# Patient Record
Sex: FEMALE | Race: BLACK OR AFRICAN AMERICAN | NOT HISPANIC OR LATINO | Employment: OTHER | URBAN - METROPOLITAN AREA
[De-identification: names, ages, dates, MRNs, and addresses within clinical notes are randomized per-mention and may not be internally consistent; named-entity substitution may affect disease eponyms.]

---

## 2017-04-19 ENCOUNTER — OFFICE VISIT (OUTPATIENT)
Dept: RETAIL CLINIC | Facility: CLINIC | Age: 47
End: 2017-04-19

## 2017-04-19 DIAGNOSIS — Z02.83 ENCOUNTER FOR DRUG SCREENING: Primary | ICD-10-CM

## 2017-04-28 ENCOUNTER — TRANSCRIBE ORDERS (OUTPATIENT)
Dept: ADMINISTRATIVE | Facility: HOSPITAL | Age: 47
End: 2017-04-28

## 2017-04-28 DIAGNOSIS — Z12.31 VISIT FOR SCREENING MAMMOGRAM: Primary | ICD-10-CM

## 2017-05-03 ENCOUNTER — HOSPITAL ENCOUNTER (OUTPATIENT)
Dept: MAMMOGRAPHY | Facility: HOSPITAL | Age: 47
Discharge: HOME | End: 2017-05-03
Admitting: OBSTETRICS & GYNECOLOGY

## 2017-05-03 DIAGNOSIS — Z12.31 VISIT FOR SCREENING MAMMOGRAM: ICD-10-CM

## 2017-05-03 PROCEDURE — 77063 BREAST TOMOSYNTHESIS BI: CPT

## 2017-05-03 PROCEDURE — 77063 BREAST TOMOSYNTHESIS BI: CPT | Performed by: RADIOLOGY

## 2017-05-03 PROCEDURE — 77067 SCR MAMMO BI INCL CAD: CPT | Performed by: RADIOLOGY

## 2017-05-03 PROCEDURE — G0202 SCR MAMMO BI INCL CAD: HCPCS

## 2022-09-26 ENCOUNTER — OFFICE VISIT (OUTPATIENT)
Dept: INTERNAL MEDICINE CLINIC | Age: 52
End: 2022-09-26
Payer: MEDICAID

## 2022-09-26 VITALS
OXYGEN SATURATION: 98 % | RESPIRATION RATE: 16 BRPM | DIASTOLIC BLOOD PRESSURE: 80 MMHG | BODY MASS INDEX: 28.99 KG/M2 | WEIGHT: 174 LBS | SYSTOLIC BLOOD PRESSURE: 128 MMHG | TEMPERATURE: 98.2 F | HEART RATE: 84 BPM | HEIGHT: 65 IN

## 2022-09-26 DIAGNOSIS — F17.200 CURRENT EVERY DAY SMOKER: ICD-10-CM

## 2022-09-26 DIAGNOSIS — N95.1 MENOPAUSAL VASOMOTOR SYNDROME: Primary | ICD-10-CM

## 2022-09-26 DIAGNOSIS — Z00.00 PHYSICAL EXAM, ROUTINE: ICD-10-CM

## 2022-09-26 PROCEDURE — 99204 OFFICE O/P NEW MOD 45 MIN: CPT | Performed by: NURSE PRACTITIONER

## 2022-09-26 NOTE — PROGRESS NOTES
Rm    Chief Complaint   Patient presents with    New Patient    Irregular Menses     Hot flashes        Visit Vitals  /80 (BP 1 Location: Left upper arm, BP Patient Position: Sitting, BP Cuff Size: Adult)   Pulse 84   Temp 98.2 °F (36.8 °C) (Temporal)   Resp 16   Ht 5' 5\" (1.651 m)   Wt 174 lb (78.9 kg)   LMP 05/06/2022   SpO2 98%   BMI 28.96 kg/m²        1. Have you been to the ER, urgent care clinic since your last visit? Hospitalized since your last visit? No    2. Have you seen or consulted any other health care providers outside of the [address] [place] [address] since your last visit? Include any pap smears or colon screening. No     Health Maintenance Due   Topic Date Due    Hepatitis C Screening  Never done    Depression Screen  Never done    COVID-19 Vaccine (1) Never done    DTaP/Tdap/Td series (1 - Tdap) Never done    Cervical cancer screen  Never done    Lipid Screen  Never done    Colorectal Cancer Screening Combo  Never done    Flu Vaccine (1) Never done        No flowsheet data found. No flowsheet data found. No flowsheet data found.

## 2022-09-26 NOTE — PROGRESS NOTES
Subjective  Marco Galeas is a 54 y.o. female. Matthew Adamson is here to establish care. \ no PMH     Moved from  [place]. Current every day smoker/ 1/2 ppd. No desire to quit. 20 yrs. Quit twice. Quit cold turkey AND  Cleanse. Lasted 6 months. Lived in [place] as a . Traveled a lot but settled in [address] Concerns today with hot flashes and irregular menses. LMP  Late April. PAP Over 3 yrs ago. Nl.   Has had 3 mammograms. 4-5/ hr. Debilitating. Discussed options/ preference of oral Estrogen. No history of CAD, liver ds, emboli, history of breast cancer. New Patient  The history is provided by the Patient. This is a new problem. Pertinent negatives include no chest pain, no abdominal pain, no headaches and no shortness of breath. Irregular Menses  Pertinent negatives include no chest pain, no abdominal pain, no headaches and no shortness of breath. History reviewed. No pertinent past medical history. History reviewed. No pertinent surgical history. Review of Systems   Constitutional:  Positive for diaphoresis. Negative for chills, fever and malaise/fatigue. Respiratory:  Negative for cough and shortness of breath. Cardiovascular:  Negative for chest pain. Gastrointestinal:  Negative for abdominal pain, nausea and vomiting. Musculoskeletal:  Negative for joint pain and myalgias. Skin:  Negative for itching and rash. Neurological:  Negative for dizziness and headaches. Endo/Heme/Allergies:  Does not bruise/bleed easily. Psychiatric/Behavioral:  Negative for depression. Objective  Physical Exam  Vitals and nursing note reviewed. Constitutional:       General: She is not in acute distress. Appearance: Normal appearance. She is normal weight. HENT:      Head: Normocephalic and atraumatic. Right Ear: Tympanic membrane normal.      Left Ear: Tympanic membrane normal.      Nose: Nose normal. No rhinorrhea.       Mouth/Throat:      Mouth: Mucous membranes are moist.      Pharynx: Oropharynx is clear. No oropharyngeal exudate. Eyes:      Extraocular Movements: Extraocular movements intact. Pupils: Pupils are equal, round, and reactive to light. Cardiovascular:      Rate and Rhythm: Normal rate and regular rhythm. Pulses: Normal pulses. Heart sounds: Normal heart sounds. Pulmonary:      Effort: Pulmonary effort is normal.      Breath sounds: Normal breath sounds. Abdominal:      Palpations: Abdomen is soft. Tenderness: There is no abdominal tenderness. Musculoskeletal:         General: Normal range of motion. Cervical back: Normal range of motion and neck supple. No muscular tenderness. Skin:     General: Skin is warm and dry. Capillary Refill: Capillary refill takes less than 2 seconds. Neurological:      General: No focal deficit present. Mental Status: She is alert. Psychiatric:         Mood and Affect: Mood normal.      Assessment & Plan    ICD-10-CM ICD-9-CM    1. Menopausal vasomotor syndrome  N95.1 627.2       2. Current every day smoker  F17.200 305.1       3. Physical exam, routine  Z00.00 V70.0 LIPID PANEL      CBC WITH AUTOMATED DIFF      METABOLIC PANEL, COMPREHENSIVE      TSH 3RD GENERATION      VITAMIN D, 25 HYDROXY      [facility] MAMMO BI SCREENING INCL CAD        Encounter Diagnoses   Name Primary? Menopausal vasomotor syndrome Yes    Current every day smoker     Physical exam, routine      Orders Placed This Encounter    [facility] MAMMO BI SCREENING INCL CAD    LIPID PANEL    CBC WITH AUTOMATED DIFF    METABOLIC PANEL, COMPREHENSIVE    TSH 3RD GENERATION    VITAMIN D, 25 HYDROXY    conjugated estrogens (PREMARIN) 0.3 mg tablet   Medication as directed  Consider smoking cessation.    Schedule women's health and mammogram.  Return 6 months  Signed By: JANETTE Morgan     September 26, 2022            JANETTE Morgan

## 2022-10-05 DIAGNOSIS — Z00.00 PHYSICAL EXAM, ROUTINE: ICD-10-CM

## 2022-10-05 NOTE — LETTER
10/11/2022    [address] [address]      Dear Vishnu Major reviewed your test results as listed below. Alejandra,  Your labs are pretty darn good. Vitamin D slightly low. Can improve with adding foods with Vitamin D added. Other values, not worrisome. The LDL on cholesterol can be improved with increased activity and a lower cholesterol diet.   See you on the 26th Michelle    Please call me if you have any questions: [phone]        Sincerely,      JANETTE Mcdermott

## 2022-10-06 LAB
25(OH)D3 SERPL-MCNC: 28.9 NG/ML (ref 30–100)
ALBUMIN SERPL-MCNC: 2.9 G/DL (ref 3.5–5)
ALBUMIN/GLOB SERPL: 1 {RATIO} (ref 1.1–2.2)
ALP SERPL-CCNC: 55 U/L (ref 45–117)
ALT SERPL-CCNC: 16 U/L (ref 12–78)
ANION GAP SERPL CALC-SCNC: 6 MMOL/L (ref 5–15)
AST SERPL-CCNC: 14 U/L (ref 15–37)
BASOPHILS # BLD: 0 K/UL (ref 0–0.1)
BASOPHILS NFR BLD: 0 % (ref 0–1)
BILIRUB SERPL-MCNC: 0.4 MG/DL (ref 0.2–1)
BUN SERPL-MCNC: 7 MG/DL (ref 6–20)
BUN/CREAT SERPL: 9 (ref 12–20)
CALCIUM SERPL-MCNC: 8.3 MG/DL (ref 8.5–10.1)
CHLORIDE SERPL-SCNC: 108 MMOL/L (ref 97–108)
CHOLEST SERPL-MCNC: 198 MG/DL
CO2 SERPL-SCNC: 27 MMOL/L (ref 21–32)
CREAT SERPL-MCNC: 0.81 MG/DL (ref 0.55–1.02)
DIFFERENTIAL METHOD BLD: ABNORMAL
EOSINOPHIL # BLD: 0.1 K/UL (ref 0–0.4)
EOSINOPHIL NFR BLD: 2 % (ref 0–7)
ERYTHROCYTE [DISTWIDTH] IN BLOOD BY AUTOMATED COUNT: 16.2 % (ref 11.5–14.5)
GLOBULIN SER CALC-MCNC: 3 G/DL (ref 2–4)
GLUCOSE SERPL-MCNC: 95 MG/DL (ref 65–100)
HCT VFR BLD AUTO: 46.5 % (ref 35–47)
HDLC SERPL-MCNC: 52 MG/DL
HDLC SERPL: 3.8 {RATIO} (ref 0–5)
HGB BLD-MCNC: 15.4 G/DL (ref 11.5–16)
IMM GRANULOCYTES # BLD AUTO: 0 K/UL (ref 0–0.04)
IMM GRANULOCYTES NFR BLD AUTO: 0 % (ref 0–0.5)
LDLC SERPL CALC-MCNC: 125.4 MG/DL (ref 0–100)
LYMPHOCYTES # BLD: 1.6 K/UL (ref 0.8–3.5)
LYMPHOCYTES NFR BLD: 24 % (ref 12–49)
MCH RBC QN AUTO: 29.3 PG (ref 26–34)
MCHC RBC AUTO-ENTMCNC: 33.1 G/DL (ref 30–36.5)
MCV RBC AUTO: 88.4 FL (ref 80–99)
MONOCYTES # BLD: 0.5 K/UL (ref 0–1)
MONOCYTES NFR BLD: 8 % (ref 5–13)
NEUTS SEG # BLD: 4.4 K/UL (ref 1.8–8)
NEUTS SEG NFR BLD: 66 % (ref 32–75)
NRBC # BLD: 0 K/UL (ref 0–0.01)
NRBC BLD-RTO: 0 PER 100 WBC
PLATELET # BLD AUTO: 206 K/UL (ref 150–400)
PMV BLD AUTO: 10.2 FL (ref 8.9–12.9)
POTASSIUM SERPL-SCNC: 4.4 MMOL/L (ref 3.5–5.1)
PROT SERPL-MCNC: 5.9 G/DL (ref 6.4–8.2)
RBC # BLD AUTO: 5.26 M/UL (ref 3.8–5.2)
SODIUM SERPL-SCNC: 141 MMOL/L (ref 136–145)
TRIGL SERPL-MCNC: 103 MG/DL (ref ?–150)
TSH SERPL DL<=0.05 MIU/L-ACNC: 2.43 UIU/ML (ref 0.36–3.74)
VLDLC SERPL CALC-MCNC: 20.6 MG/DL
WBC # BLD AUTO: 6.7 K/UL (ref 3.6–11)

## 2022-10-21 ENCOUNTER — TELEPHONE (OUTPATIENT)
Dept: INTERNAL MEDICINE CLINIC | Age: 52
End: 2022-10-21

## 2022-10-21 NOTE — TELEPHONE ENCOUNTER
Left vcm to ask patient if she could call us back and verify address so labs can be sent out in mail. Lab results will be placed in box of outgoing mail. However, since they have been sent back to us twice, it would be helpful if patient called us back to verify address.

## 2022-10-25 ENCOUNTER — TELEPHONE (OUTPATIENT)
Dept: INTERNAL MEDICINE CLINIC | Age: 52
End: 2022-10-25

## 2022-10-25 RX ORDER — PROGESTERONE 100 MG/1
100 CAPSULE ORAL DAILY
Qty: 30 CAPSULE | Refills: 2 | Status: SHIPPED | OUTPATIENT
Start: 2022-10-25 | End: 2022-10-26 | Stop reason: SINTOL

## 2022-10-25 NOTE — TELEPHONE ENCOUNTER
Please call Alejandra. In order to be on oral estrogen, I need to add / supplement progesterone to prevent uterine lining build up. The dose is 100mg and taken daily. May cause some vaginal bleeding initially but limited to initiation. I will send Prescription to pharmacy. Please discuss symptom management with gyn at your next appointment.

## 2022-10-26 ENCOUNTER — OFFICE VISIT (OUTPATIENT)
Dept: INTERNAL MEDICINE CLINIC | Age: 52
End: 2022-10-26
Payer: MEDICAID

## 2022-10-26 ENCOUNTER — TELEPHONE (OUTPATIENT)
Dept: INTERNAL MEDICINE CLINIC | Age: 52
End: 2022-10-26

## 2022-10-26 VITALS
BODY MASS INDEX: 29.92 KG/M2 | DIASTOLIC BLOOD PRESSURE: 81 MMHG | RESPIRATION RATE: 18 BRPM | HEIGHT: 65 IN | HEART RATE: 72 BPM | TEMPERATURE: 98.1 F | WEIGHT: 179.6 LBS | OXYGEN SATURATION: 99 % | SYSTOLIC BLOOD PRESSURE: 122 MMHG

## 2022-10-26 DIAGNOSIS — M25.571 ACUTE RIGHT ANKLE PAIN: ICD-10-CM

## 2022-10-26 DIAGNOSIS — N95.1 MENOPAUSAL VASOMOTOR SYNDROME: Primary | ICD-10-CM

## 2022-10-26 DIAGNOSIS — F17.200 CURRENT EVERY DAY SMOKER: ICD-10-CM

## 2022-10-26 DIAGNOSIS — M10.00 ACUTE IDIOPATHIC GOUT, UNSPECIFIED SITE: ICD-10-CM

## 2022-10-26 PROCEDURE — 99213 OFFICE O/P EST LOW 20 MIN: CPT | Performed by: NURSE PRACTITIONER

## 2022-10-26 RX ORDER — NAPROXEN 500 MG/1
500 TABLET ORAL 2 TIMES DAILY WITH MEALS
Qty: 14 TABLET | Refills: 0 | Status: SHIPPED | OUTPATIENT
Start: 2022-10-26 | End: 2022-11-02

## 2022-10-26 RX ORDER — MELATONIN
1000 DAILY
Qty: 30 TABLET | Refills: 2 | Status: SHIPPED | OUTPATIENT
Start: 2022-10-26

## 2022-10-26 RX ORDER — VENLAFAXINE HYDROCHLORIDE 37.5 MG/1
CAPSULE, EXTENDED RELEASE ORAL
Qty: 15 CAPSULE | Refills: 1 | Status: SHIPPED | OUTPATIENT
Start: 2022-10-26 | End: 2022-10-27 | Stop reason: CLARIF

## 2022-10-26 NOTE — PROGRESS NOTES
Rashad Hilton is a 54 y.o. female  Chief Complaint   Patient presents with    Follow-up     Medications     Foot Pain     3 days ago woke up from a nap and she said that her right ankle felt like it she twisted her ankle and was swollen      Visit Vitals  /81 (BP 1 Location: Left upper arm, BP Patient Position: Sitting, BP Cuff Size: Adult)   Pulse 72   Temp 98.1 °F (36.7 °C) (Temporal)   Resp 18   Ht 5' 5\" (1.651 m)   Wt 179 lb 9.6 oz (81.5 kg)   SpO2 99%   BMI 29.89 kg/m²          HPI    Patient here for follow up for vasomotor symptoms/ hot flashes  She reports she has not started the progesterone. Hot flashes have become significantly better however she began bleeding. She reports her last period was early this year and has not had a full year without menses. Ready to quit smoking. Has quit before. \"[place]\" Was very healthy at one time. Quit for 6 months. And 1 yr. 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 Did a detox for 7 days. She feels she   can quit \"cold turkey\"   Did  not like gum. Does not want a nicotine replacement. Does not want a pill. Smokes 1/2 ppd. Pains are popping up since starting estorgen. Feels  like bones  are fractured. Left shoulder pain/ resolved    3 days ago took a nap and woke with right ankle pain. It was red and Swollen. Lateral ankle. Took 800mg Ibuprofen which helped. Toes were painful throbbing but not red or swollen. No history of trauma or previous     ROS  Review of Systems   Constitutional:  Negative for chills, fever and malaise/fatigue. Cardiovascular:  Negative for chest pain and palpitations. Gastrointestinal:  Negative for nausea and vomiting. Genitourinary:  Negative for dysuria. Musculoskeletal:  Positive for joint pain. Negative for back pain and myalgias. Neurological:  Negative for dizziness and headaches. EXAM  Physical Exam  Vitals and nursing note reviewed.    Constitutional:       General: She is not in acute distress. Appearance: Normal appearance. She is not ill-appearing. HENT:      Head: Normocephalic and atraumatic. Eyes:      Pupils: Pupils are equal, round, and reactive to light. Cardiovascular:      Rate and Rhythm: Normal rate. Pulses: Normal pulses. Pulmonary:      Effort: Pulmonary effort is normal.   Musculoskeletal:      Cervical back: Normal range of motion. Left ankle: Swelling present. Tenderness present over the lateral malleolus. Decreased range of motion. Legs:       Comments: Erythema, edema and significant tenderness right lateral malleolus   Skin:     Capillary Refill: Capillary refill takes less than 2 seconds. Neurological:      General: No focal deficit present. Mental Status: She is alert. Psychiatric:         Mood and Affect: Mood normal.   Right lateral ankle red, hot , swollen and painful  ASSESSMENT/PLAN      ICD-10-CM ICD-9-CM    1. Menopausal vasomotor syndrome  N95.1 627.2       2. Acute idiopathic gout, unspecified site  M10.00 274.01       3. Current every day smoker  F17.200 305.1       4. Acute right ankle pain  M25.571 719.47      338.19             Stop oral estrogen  Effexor 1/2 tab at night daily  Exercise/ avoid alcohol  Extensive discussion of smoking cessation techniques. Patient will formulate plan of action. Will refer if needed for counseling. Ice ankle PRN. Elevated/ medication as directed with food.      Return 2 months  Signed By: JANETTE Tripathi     October 26, 2022

## 2022-10-26 NOTE — TELEPHONE ENCOUNTER
Script Clarification:    1/2 capsules?      Drug:  Venlafaxine ER 37.5 mg capsule, extended rel ease 24 hour

## 2022-10-26 NOTE — PROGRESS NOTES
Chief Complaint   Patient presents with    Follow-up     Medications     Foot Pain     3 days ago woke up from a nap and she said that her right ankle felt like it she twisted her ankle and was swollen           Vitals:    10/26/22 0948   BP: 122/81   Pulse: 72   Resp: 18   Temp: 98.1 °F (36.7 °C)   TempSrc: Temporal   SpO2: 99%   Weight: 179 lb 9.6 oz (81.5 kg)   Height: 5' 5\" (1.651 m)   LMP: 10/03/2022       Current Outpatient Medications on File Prior to Visit   Medication Sig Dispense Refill    conjugated estrogens (PREMARIN) 0.3 mg tablet Take 1 Tablet by mouth daily for 30 days. 30 Tablet 1    progesterone (PROMETRIUM) 100 mg capsule Take 1 Capsule by mouth daily for 30 days. (Patient not taking: Reported on 10/26/2022) 30 Capsule 2     No current facility-administered medications on file prior to visit. Health Maintenance Due   Topic    Depression Screen     COVID-19 Vaccine (1)    Pneumococcal 0-64 years (1 - PCV)    DTaP/Tdap/Td series (1 - Tdap)    Cervical cancer screen     Colorectal Cancer Screening Combo     Flu Vaccine (1)       1. \"Have you been to the ER, urgent care clinic since your last visit? Hospitalized since your last visit? \" No    2. \"Have you seen or consulted any other health care providers outside of the [address] [place] [address] since your last visit? \" No     3. For patients aged 39-70: Has the patient had a colonoscopy / FIT/ Cologuard? No      If the patient is female:    4. For patients aged 41-77: Has the patient had a mammogram within the past 2 years? No    5. For patients aged 21-65: Has the patient had a pap smear?  No

## 2022-10-27 RX ORDER — VENLAFAXINE 37.5 MG/1
TABLET ORAL
Qty: 15 TABLET | Refills: 2 | Status: SHIPPED | OUTPATIENT
Start: 2022-10-27